# Patient Record
Sex: FEMALE | Race: WHITE | NOT HISPANIC OR LATINO
[De-identification: names, ages, dates, MRNs, and addresses within clinical notes are randomized per-mention and may not be internally consistent; named-entity substitution may affect disease eponyms.]

---

## 2023-06-13 PROBLEM — Z00.00 ENCOUNTER FOR PREVENTIVE HEALTH EXAMINATION: Status: ACTIVE | Noted: 2023-06-13

## 2023-06-21 ENCOUNTER — APPOINTMENT (OUTPATIENT)
Dept: ORTHOPEDIC SURGERY | Facility: CLINIC | Age: 53
End: 2023-06-21
Payer: MEDICARE

## 2023-06-21 PROCEDURE — 99204 OFFICE O/P NEW MOD 45 MIN: CPT

## 2023-06-21 NOTE — DATA REVIEWED
[FreeTextEntry1] : 5/17/23\par [facility] X Ray Right Knee: 4 view:\par advanced tricompartmental OA\par

## 2023-06-21 NOTE — DISCUSSION/SUMMARY
[de-identified] : Reviewed all images with patient. \par continue otc nsaids as need.\par Physical therapy prescribed and recommended for tricompartmental OA.\par Patient will follow up as needed.\par \par \par \par \par -----------------------------------------------\par Home Exercise\par The patient is instructed on a home exercise program.\par \par WILLIE STILES Acting as a Scribe for Dr. Rodriguez\par I, Willie Stiles, attest that this documentation has been prepared under the direction and in the presence of Provider Tom Rodriguez MD.\par \par Activity Modification\par The patient was advised to modify their activities.\par \par Dx / Natural History\par The patient was advised of the diagnosis.  The natural history of the pathology was explained in full to the patient in layman's terms.  Several different treatment options were discussed and explained in full to the patient including the risks and benefits of both surgical and non-surgical treatments.  All questions and concerns were answered.\par \par Pain Guide Activities\par The patient was advised to let pain guide the gradual advancement of activities.\par \par RICE\par I explained to the patient that rest, ice, compression, and elevation would benefit them.  They may return to activity after follow-up or when they no longer have any pain.\par \par The patient's current medication management of their orthopedic diagnosis was reviewed today:\par (1) We discussed a comprehensive treatment plan that included possible pharmaceutical management involving the use of prescription strength medications including but not limited to options such as oral Naprosyn 500mg BID, once daily Meloxicam 15 mg, or 500-650 mg Tylenol versus over the counter oral medications and topical prescription NSAID Pennsaid vs over the counter Voltaren gel.\par (2) There is a moderate risk of morbidity with further treatment, especially from use of prescription strength medications and possible side effects of these medications which include upset stomach with oral medications, skin reactions to topical medications and cardiac/renal issues with long term use.\par (3) I recommended that the patient follow-up with their medical physician to discuss any significant specific potential issues with long term medication use such as interactions with current medications or with exacerbation of underlying medical comorbidities.\par (4) The benefits and risks associated with use of injectable, oral or topical, prescription and over the counter anti-inflammatory medications were discussed with the patient. The patient voiced understanding of the risks including but not limited to bleeding, stroke, kidney dysfunction, heart disease, and were referred to the black box warning label for further information.

## 2023-06-21 NOTE — HISTORY OF PRESENT ILLNESS
[de-identified] : The patient is a 52 year old [RIGHT/LEFT] hand dominant female who presents today complaining of R knee.  \par Date of Injury/Onset: 05/17/23\par Pain:    At Rest: Unable to attain/10 \par With Activity:  Unable to attain/10 \par Mechanism of injury: Care giver noted \par This is NOT a Work Related Injury being treated under Worker's Compensation.\par This is NOT an athletic injury occurring associated with an interscholastic or organized sports team.\par Quality of symptoms: Unable to attain\par Improves with: Unable to attain\par Worse with: Unable to attain\par Prior treatment: [facility]to; OTC NSAIDs, MDP \par Prior Imaging: X-ray ([facility])\par Out of work/sport: _, since _\par School/Sport/Position/Occupation: Disability; Resident at [employer]\par Additional Information: PLEASE SEE COMPLETED MEDICAL HISTORY IN OUTSIDE MEDICAL REPORT DATED 5.23.23\par \par

## 2023-06-21 NOTE — PHYSICAL EXAM
[de-identified] : Neurovascularly intact distally\par \par Right Knee: \par limited exam \par no effusion \par short arc rom without pain \par ligamental stable \par

## 2023-10-10 ENCOUNTER — OFFICE (OUTPATIENT)
Dept: URBAN - METROPOLITAN AREA CLINIC 103 | Facility: CLINIC | Age: 53
Setting detail: OPHTHALMOLOGY
End: 2023-10-10
Payer: MEDICARE

## 2023-10-10 DIAGNOSIS — H26.40: ICD-10-CM

## 2023-10-10 DIAGNOSIS — H10.45: ICD-10-CM

## 2023-10-10 PROCEDURE — 92014 COMPRE OPH EXAM EST PT 1/>: CPT | Performed by: OPHTHALMOLOGY

## 2023-10-10 ASSESSMENT — REFRACTION_AUTOREFRACTION
OS_SPHERE: +0.50
OD_SPHERE: +0.75
OD_CYLINDER: -2.25
OD_AXIS: 134
OS_AXIS: 048
OS_CYLINDER: -1.75

## 2023-10-10 ASSESSMENT — KERATOMETRY
OS_K1POWER_DIOPTERS: 47.50
OS_K2POWER_DIOPTERS: 48.75
OD_AXISANGLE_DEGREES: 045
OD_K2POWER_DIOPTERS: 48.25
OS_AXISANGLE_DEGREES: 125
OD_K1POWER_DIOPTERS: 45.75

## 2023-10-10 ASSESSMENT — CONFRONTATIONAL VISUAL FIELD TEST (CVF)
OD_FINDINGS: FULL
OS_FINDINGS: FULL

## 2023-10-10 ASSESSMENT — VISUAL ACUITY
OD_BCVA: 20/50
OS_BCVA: 20/50-2

## 2023-10-10 ASSESSMENT — SPHEQUIV_DERIVED
OS_SPHEQUIV: -0.375
OD_SPHEQUIV: -0.375

## 2023-10-10 ASSESSMENT — AXIALLENGTH_DERIVED
OD_AL: 22.5049
OS_AL: 22.1351

## 2024-10-15 ENCOUNTER — OFFICE (OUTPATIENT)
Dept: URBAN - METROPOLITAN AREA CLINIC 103 | Facility: CLINIC | Age: 54
Setting detail: OPHTHALMOLOGY
End: 2024-10-15
Payer: MEDICARE

## 2024-10-15 DIAGNOSIS — H26.492: ICD-10-CM

## 2024-10-15 DIAGNOSIS — H10.433: ICD-10-CM

## 2024-10-15 PROCEDURE — 92014 COMPRE OPH EXAM EST PT 1/>: CPT | Performed by: OPHTHALMOLOGY

## 2024-10-15 ASSESSMENT — KERATOMETRY
OD_K1POWER_DIOPTERS: 46.00
OS_K1POWER_DIOPTERS: 46.00
OD_K2POWER_DIOPTERS: 47.50
OS_AXISANGLE_DEGREES: 127
OS_K2POWER_DIOPTERS: 48.25
OD_AXISANGLE_DEGREES: 040

## 2024-10-15 ASSESSMENT — VISUAL ACUITY
OD_BCVA: 20/50
OS_BCVA: 20/50+2

## 2024-10-15 ASSESSMENT — REFRACTION_AUTOREFRACTION
OD_CYLINDER: -2.25
OS_SPHERE: UTP
OD_AXIS: 134
OD_SPHERE: +0.75

## 2024-10-15 ASSESSMENT — CONFRONTATIONAL VISUAL FIELD TEST (CVF)
OS_FINDINGS: FULL
OD_FINDINGS: FULL

## 2025-04-01 ENCOUNTER — NON-APPOINTMENT (OUTPATIENT)
Age: 55
End: 2025-04-01